# Patient Record
Sex: MALE | Race: OTHER
[De-identification: names, ages, dates, MRNs, and addresses within clinical notes are randomized per-mention and may not be internally consistent; named-entity substitution may affect disease eponyms.]

---

## 2020-11-13 ENCOUNTER — HOSPITAL ENCOUNTER (EMERGENCY)
Dept: HOSPITAL 63 - ER | Age: 40
Discharge: HOME | End: 2020-11-13
Payer: COMMERCIAL

## 2020-11-13 VITALS — WEIGHT: 190.04 LBS | BODY MASS INDEX: 25.74 KG/M2 | HEIGHT: 72 IN

## 2020-11-13 VITALS — SYSTOLIC BLOOD PRESSURE: 101 MMHG | DIASTOLIC BLOOD PRESSURE: 53 MMHG

## 2020-11-13 DIAGNOSIS — K75.9: ICD-10-CM

## 2020-11-13 DIAGNOSIS — Z88.0: ICD-10-CM

## 2020-11-13 DIAGNOSIS — Z21: ICD-10-CM

## 2020-11-13 DIAGNOSIS — R36.9: ICD-10-CM

## 2020-11-13 DIAGNOSIS — Z20.2: ICD-10-CM

## 2020-11-13 DIAGNOSIS — R30.0: Primary | ICD-10-CM

## 2020-11-13 LAB
APTT PPP: COLORLESS S
BACTERIA #/AREA URNS HPF: (no result) /HPF
BILIRUB UR QL STRIP: (no result)
FIBRINOGEN PPP-MCNC: (no result) MG/DL
GLUCOSE UR STRIP-MCNC: (no result) MG/DL
NITRITE UR QL STRIP: (no result)
RBC #/AREA URNS HPF: (no result) /HPF (ref 0–2)
SP GR UR STRIP: >=1.03
SQUAMOUS #/AREA URNS LPF: (no result) /LPF
UROBILINOGEN UR-MCNC: 0.2 MG/DL
WBC #/AREA URNS HPF: (no result) /HPF (ref 0–4)

## 2020-11-13 PROCEDURE — 81001 URINALYSIS AUTO W/SCOPE: CPT

## 2020-11-13 PROCEDURE — 96372 THER/PROPH/DIAG INJ SC/IM: CPT

## 2020-11-13 PROCEDURE — 99284 EMERGENCY DEPT VISIT MOD MDM: CPT

## 2020-11-13 NOTE — PHYS DOC
Past History


Past Medical History:  HIV, Hepatitis, STD


Alcohol Use:  None





Adult General


Chief Complaint


Chief Complaint:  SEXUALLY TRANSMITTED DISEASE





HPI


HPI





Patient is a 40-year-old male patient with history of HIV currently on 

medications, hepatitis, STDs, who presents to the ED today complaining of 

dysuria and penile discharge that began yesterday.  Patient reports having 

unprotected sex.





Review of Systems


Review of Systems





Constitutional: Denies fever or chills []


GI: Denies abdominal pain, nausea, vomiting, bloody stools or diarrhea []


: Reports dysuria and penile discharge, denies hematuria []


Musculoskeletal: Denies back pain or joint pain []


Integument: Denies rash or skin lesions []


Neurologic: Denies headache, focal weakness or sensory changes []








All other systems were reviewed and found to be within normal limits, except as 

documented in this note.





Current Medications


Current Medications





Current Medications








 Medications


  (Trade)  Dose


 Ordered  Sig/Natali  Start Time


 Stop Time Status Last Admin


Dose Admin


 


 Azithromycin


  (Zithromax)  1,000 mg  1X  ONCE  11/13/20 15:30


 11/13/20 15:33 DC  





 


 Ceftriaxone Sodium


  (Rocephin Im)  1 gm  1X  ONCE  11/13/20 15:30


 11/13/20 15:33 DC  





 


 Cetirizine HCl


  (ZyrTEC)  10 mg  1X  STAT  11/13/20 15:22


 11/13/20 15:33 DC  





 


 Lidocaine HCl


  (Lidocaine 1% Pf)  2 ml  1X  ONCE  11/13/20 15:30


 11/13/20 15:33 DC  





 


 Metronidazole


  (Flagyl)  2,000 mg  1X  ONCE  11/13/20 15:30


 11/13/20 15:33 DC  





 


 Prednisone


  (Prednisone)  60 mg  1X  ONCE  11/13/20 15:30


 11/13/20 15:33 DC  














Allergies


Allergies





Allergies








Coded Allergies Type Severity Reaction Last Updated Verified


 


  Penicillins Allergy Mild Rash 11/13/20 Yes











Physical Exam


Physical Exam





Constitutional: Well developed, well nourished, no acute distress, non-toxic 

appearance. []


Male  exam deferred. 


Skin: Warm, dry, no erythema, no rash. [] 


Back: No tenderness, no CVA tenderness. [] 


Extremities: No tenderness, no cyanosis, no clubbing, ROM intact, no edema. [] 


Neurologic: Alert and oriented X 3, normal motor function, normal sensory 

function, no focal deficits noted. []


Psychologic: Affect normal, judgement normal, mood normal. []





Current Patient Data


Vital Signs





                                   Vital Signs








  Date Time  Temp Pulse Resp B/P (MAP) Pulse Ox O2 Delivery O2 Flow Rate FiO2


 


11/13/20 15:20 98.8 96 16 101/53 (69) 100 Room Air  











EKG


EKG


[]





Radiology/Procedures


Radiology/Procedures


[]





Heart Score


Risk Factors:


Risk Factors:  DM, Current or recent (<one month) smoker, HTN, HLP, family 

history of CAD, obesity.


Risk Scores:


Risk Factors:  DM, Current or recent (<one month) smoker, HTN, HLP, family 

history of CAD, obesity.





Course & Med Decision Making


Course & Med Decision Making


Pertinent Labs and Imaging studies reviewed. (See chart for details)





This is a 40-year-old male patient HIV positive history of STDs presenting today

 complaining of penile discharge that began yesterday.  Also complaining of 

dysuria.  Was given Flagyl Rocephin and azithromycin in the ED.  Discharged with

 doxycycline.  STD education provided.





Dragon Disclaimer


Dragon Disclaimer


This electronic medical record was generated, in whole or in part, using a voice

 recognition dictation system.





Departure


Departure:


Impression:  


   Primary Impression:  


   Concern about STD in male without diagnosis


Disposition:  01 DC HOME SELF CARE/HOMELESS


Condition:  STABLE


Referrals:  


PCP,NO (PCP)


Follow-up with your own doctor or the health department


Patient Instructions:  Sexually Transmitted Diseases-[facility]





Additional Instructions:  


You were treated for sexually transmitted diseases.  Use protection at all 

times.  Do not have any intercourse for 2 weeks.  Follow-up with your primary 

care doctor or the health department for further STD concerns


Scripts


Doxycycline Hyclate (DOXYCYCLINE HYCLATE) 100 Mg Tablet


1 TAB PO BID, #20 TAB


   Prov: FARTUN SUTTON APRN         11/13/20











FARTUN SUTTON APRAURELIO              Nov 13, 2020 15:43

## 2020-12-15 ENCOUNTER — HOSPITAL ENCOUNTER (EMERGENCY)
Dept: HOSPITAL 63 - ER | Age: 40
Discharge: HOME | End: 2020-12-15
Payer: MEDICAID

## 2020-12-15 VITALS — WEIGHT: 190.04 LBS | BODY MASS INDEX: 25.74 KG/M2 | HEIGHT: 72 IN

## 2020-12-15 VITALS — DIASTOLIC BLOOD PRESSURE: 69 MMHG | SYSTOLIC BLOOD PRESSURE: 122 MMHG

## 2020-12-15 DIAGNOSIS — Z76.0: ICD-10-CM

## 2020-12-15 DIAGNOSIS — Z88.0: ICD-10-CM

## 2020-12-15 DIAGNOSIS — F32.9: ICD-10-CM

## 2020-12-15 DIAGNOSIS — Z21: Primary | ICD-10-CM

## 2020-12-15 PROCEDURE — 99281 EMR DPT VST MAYX REQ PHY/QHP: CPT

## 2020-12-15 NOTE — PHYS DOC
Past History


Past Medical History:  Depression, HIV, Hepatitis, STD, Other


Additional Past Medical Histor:  "anger control issues" per pt


Past Surgical History:  No Surgical History


Alcohol Use:  Occasionally





Adult General


Chief Complaint


Chief Complaint:  MEDICATION REFILL





HPI


HPI





Patient is a 41yo male presenting for med refills. States he has HIV and has all

care previously managed in [place]. recently moved here ~1mo ago and has not 

established care with anyone in region as, "I don't know where to go". States he

takes x2 different HIV meds daily and has been out for x1 week. Reports today 

wanting medication refills of these medications as, "I can feel my immune system

going down". No other major complaints, no fever, known COVID 19 contact, CP, 

SHOB, AP, urinary symptoms. Patient does not know his last HIV counts, cannot 

recall CD4, is not knowledgable regarding his HIV treatment. States he wants med

refills today but if given RX, "I can't fill it, I just need the meds here and I

will go establish somewhere else"





Review of Systems


Review of Systems


14-point ROS performed. All other systems were reviewed and found to be within 

normal limits, except as documented in this note.





Allergies


Allergies





Allergies








Coded Allergies Type Severity Reaction Last Updated Verified


 


  Penicillins Allergy Mild Rash 12/15/20 Yes











Physical Exam


Physical Exam





Constitutional: Well developed, well nourished, non-toxic appearance. []


HENT: Normocephalic, atraumatic, bilateral external ears normal, oropharynx 

moist, no oral exudates, nose normal. []


Eyes: PERRLA, EOMI, conjunctiva normal, no discharge. [] 


Neck: Normal range of motion, no tenderness, supple, no stridor. [] 


Cardiovascular:Heart rate regular per monitor


Lungs & Thorax:  No resp distres, no accessory muscle use, bilateral chest rise


Abdomen: Bowel sounds normal, soft, no tenderness, no masses, no pulsatile 

masses. [] 


Skin: Warm, dry, no erythema, no rash. [] 


Back: No tenderness, no CVA tenderness. [] 


Extremities: No tenderness, no cyanosis, no clubbing, ROM intact, no edema. [] 


Neurologic: Alert and oriented X 3, normal motor function, normal sensory 

function, no focal deficits noted. []


Psychologic: Anxious affect, judgement normal, angry mood





Current Patient Data


Vital Signs





                                   Vital Signs








  Date Time  Temp Pulse Resp B/P (MAP) Pulse Ox O2 Delivery O2 Flow Rate FiO2


 


12/15/20 15:10 98.4 89 18 122/69 (86) 98 Room Air  











EKG


EKG


[]





Radiology/Procedures


Radiology/Procedures


[]





Heart Score


Risk Factors:


Risk Factors:  DM, Current or recent (<one month) smoker, HTN, HLP, family 

history of CAD, obesity.


Risk Scores:


Risk Factors:  DM, Current or recent (<one month) smoker, HTN, HLP, family 

history of CAD, obesity.





Course & Med Decision Making


Course & Med Decision Making


Patient's HIV meds not available at our location. I do not feel comfortable "mix

 and matching" HIV meds for patient today especially as he is not reliable 

historian and not knowledgeable regarding HIV treatment/levels/counts etc


Patient angry and expecting to be dispensed medication for free prior to ER 

departure. States I am refusing care of him by not providing him desired 

treatment


I disclosed he is asymptomatic, there is no indication for any ER diagnostic 

workup and/or intervention at this point, I advised him to follow-up with local 

PCP and gave him list. He is upset saying he does not have any money to do so


I advised him to contact local health dept to discuss his case and they would be

 most knowledgeable regarding state grants etc that could help him with 

obtaining his HIV medications


I also advised him to follow-up with local guidance center to establish care for

 his unknown mental health diagnoses


Patient still angry and states he will be filing a complaint as we are refusing 

him care.


I discussed SRP. Patient left without signing paperwork.





Dragon Disclaimer


Dragon Disclaimer


This electronic medical record was generated, in whole or in part, using a voice

 recognition dictation system.





Departure


Departure:


Impression:  


   Primary Impression:  


   Encounter for medication refill


Disposition:  01 DC HOME SELF CARE/HOMELESS


Condition:  STABLE


Referrals:  


PCP,NO (PCP)





Additional Instructions:  


As discussed, please utilize the information below to contact the local [facility] regarding your immediate needs.  They are located at:


[facility] [address], [place], [address]


Phone number is [phone]





You have also been given information regarding local primary care providers.  

Please browse the list, pick a provider of your liking and call them to schedule

 outpatient follow-up with them ASAP





If any concerning signs or symptoms present prior to outpatient follow-up please

 do not hesitate to come back for repeat evaluation


It was a pleasure to take care of you and I wish you the best going forward!











SYLVIA ALEJO DO                 Dec 15, 2020 16:04